# Patient Record
Sex: MALE | Race: WHITE | NOT HISPANIC OR LATINO | Employment: OTHER | URBAN - METROPOLITAN AREA
[De-identification: names, ages, dates, MRNs, and addresses within clinical notes are randomized per-mention and may not be internally consistent; named-entity substitution may affect disease eponyms.]

---

## 2019-04-15 ENCOUNTER — OFFICE VISIT (OUTPATIENT)
Dept: URGENT CARE | Facility: PHYSICIAN GROUP | Age: 69
End: 2019-04-15
Payer: MEDICARE

## 2019-04-15 ENCOUNTER — HOSPITAL ENCOUNTER (OUTPATIENT)
Dept: RADIOLOGY | Facility: MEDICAL CENTER | Age: 69
End: 2019-04-15
Attending: FAMILY MEDICINE
Payer: MEDICARE

## 2019-04-15 VITALS
BODY MASS INDEX: 35.55 KG/M2 | HEIGHT: 69 IN | SYSTOLIC BLOOD PRESSURE: 160 MMHG | RESPIRATION RATE: 18 BRPM | HEART RATE: 116 BPM | WEIGHT: 240 LBS | TEMPERATURE: 97.5 F | DIASTOLIC BLOOD PRESSURE: 90 MMHG | OXYGEN SATURATION: 91 %

## 2019-04-15 DIAGNOSIS — R05.9 COUGH: ICD-10-CM

## 2019-04-15 DIAGNOSIS — R09.02 HYPOXIA: ICD-10-CM

## 2019-04-15 DIAGNOSIS — H61.23 BILATERAL IMPACTED CERUMEN: ICD-10-CM

## 2019-04-15 DIAGNOSIS — R06.2 WHEEZING: ICD-10-CM

## 2019-04-15 PROCEDURE — 71046 X-RAY EXAM CHEST 2 VIEWS: CPT

## 2019-04-15 PROCEDURE — 94640 AIRWAY INHALATION TREATMENT: CPT | Performed by: FAMILY MEDICINE

## 2019-04-15 PROCEDURE — 99203 OFFICE O/P NEW LOW 30 MIN: CPT | Mod: 25 | Performed by: FAMILY MEDICINE

## 2019-04-15 RX ORDER — PREDNISONE 20 MG/1
60 TABLET ORAL ONCE
Status: COMPLETED | OUTPATIENT
Start: 2019-04-15 | End: 2019-04-15

## 2019-04-15 RX ORDER — IPRATROPIUM BROMIDE AND ALBUTEROL SULFATE 2.5; .5 MG/3ML; MG/3ML
3 SOLUTION RESPIRATORY (INHALATION) ONCE
Status: COMPLETED | OUTPATIENT
Start: 2019-04-15 | End: 2019-04-15

## 2019-04-15 RX ADMIN — PREDNISONE 60 MG: 20 TABLET ORAL at 13:44

## 2019-04-15 RX ADMIN — IPRATROPIUM BROMIDE AND ALBUTEROL SULFATE 3 ML: 2.5; .5 SOLUTION RESPIRATORY (INHALATION) at 13:43

## 2019-04-15 NOTE — PATIENT INSTRUCTIONS
There has been no improvement whatsoever after breathing treatment, your x-ray showed most likely a pneumonia fluid in the lung cannot be excluded    Your oxygenation is low  Please go to the nearest emergency department for further evaluation ASAP

## 2019-04-15 NOTE — PROGRESS NOTES
"Subjective:      Domenico Dyson is a 68 y.o. male who presents with Shortness of Breath ([id number] )            This is a new problem.  68-year-old male who is a smoker presenting with more than a week history of worsening cough and wheezing.  He has been cutting down on his tobacco.  He denies any fever but has had some chills.  He does not use any inhaler.  He has not seen a doctor for years.  He denies any sore throat or ear pain.  Denies any dizziness or lightheadedness.  He has been using [employer] over-the-counter.        Review of Systems   All other systems reviewed and are negative.         Objective:   /90   Pulse (!) 116   Temp 36.4 °C (97.5 °F) (Temporal)   Resp 18   Ht 1.753 m (5' 9\")   Wt 108.9 kg (240 lb)   SpO2 91%   BMI 35.44 kg/m²     Physical Exam   Constitutional: He is oriented to person, place, and time. He appears well-developed and well-nourished.  Non-toxic appearance. No distress.   HENT:   Head: Normocephalic and atraumatic.   Right Ear: External ear normal.   Left Ear: External ear normal.   Nose: No rhinorrhea.   Mouth/Throat: Uvula is midline and oropharynx is clear and moist. No oral lesions. No trismus in the jaw. No uvula swelling. No oropharyngeal exudate, posterior oropharyngeal edema, posterior oropharyngeal erythema or tonsillar abscesses.   Bilateral cerumen impaction.   Eyes: Conjunctivae are normal.   Cardiovascular: Regular rhythm.  Tachycardia present.  Exam reveals no gallop and no friction rub.    No murmur heard.  Pulmonary/Chest: No stridor. Tachypnea (Slightly) noted. He has decreased breath sounds in the right lower field. He has wheezes. He has rhonchi. He has rales.   After the neb absolutely no improvement.   Lymphadenopathy:     He has no cervical adenopathy.   Neurological: He is alert and oriented to person, place, and time.   Skin: Skin is warm. No rash noted. He is not diaphoretic. No erythema. No pallor.   Psychiatric: He has a normal mood and " affect.       Chest x-ray shows bronchial wall thickening, questionable left lower lobe infiltrate, also cardiomegaly and raised right diaphragm          Assessment/Plan:     ASSESSMENT:PLAN:  1. Wheezing  - ipratropium-albuterol (DUONEB) nebulizer solution; 3 mL by Nebulization route Once.  - predniSONE (DELTASONE) tablet 60 mg; Take 3 Tabs by mouth Once.    2. Hypoxia    3. Cough  - DX-CHEST-2 VIEWS; Future    4. Bilateral impacted cerumen      Patient received DuoNeb and 1 dose of oral steroid from is here after which absolutely no improvement noted.  He Continues to be hypoxic at 91%.  There is also a possibility of superimposed congestive heart failure  Patient has not had any care for the longest period of time  He denies any dizziness or lightheadedness or chest pain but he feels winded when he walks around.  His wife is going to take him to the ED and discussed with ED physician at [facility]

## 2019-04-16 ENCOUNTER — TELEPHONE (OUTPATIENT)
Dept: URGENT CARE | Facility: PHYSICIAN GROUP | Age: 69
End: 2019-04-16

## 2019-04-16 NOTE — TELEPHONE ENCOUNTER
Call for follow-up and was able to talk to wife  Patient went to the emergency room yesterday as recommended and he was admitted and reportedly in congestive heart failure as I suspected   Wife states that he is going to have a procedure done and they are going to keep him for 4 days and may have something with his heart.  She appreciated the phone call